# Patient Record
Sex: FEMALE | Race: BLACK OR AFRICAN AMERICAN | NOT HISPANIC OR LATINO | Employment: UNEMPLOYED | URBAN - METROPOLITAN AREA
[De-identification: names, ages, dates, MRNs, and addresses within clinical notes are randomized per-mention and may not be internally consistent; named-entity substitution may affect disease eponyms.]

---

## 2021-11-29 ENCOUNTER — CLINICAL SUPPORT (OUTPATIENT)
Dept: REHABILITATION | Facility: HOSPITAL | Age: 46
End: 2021-11-29
Payer: MEDICAID

## 2021-11-29 DIAGNOSIS — M54.50 LUMBAR PAIN: ICD-10-CM

## 2021-11-29 DIAGNOSIS — R53.1 WEAKNESS: ICD-10-CM

## 2021-11-29 PROCEDURE — 97161 PT EVAL LOW COMPLEX 20 MIN: CPT | Mod: PN | Performed by: PHYSICAL THERAPIST

## 2021-11-30 PROBLEM — R53.1 WEAKNESS: Status: ACTIVE | Noted: 2021-11-30

## 2021-11-30 PROBLEM — M54.50 LUMBAR PAIN: Status: ACTIVE | Noted: 2021-11-30

## 2021-12-06 ENCOUNTER — CLINICAL SUPPORT (OUTPATIENT)
Dept: REHABILITATION | Facility: HOSPITAL | Age: 46
End: 2021-12-06
Payer: MEDICAID

## 2021-12-06 DIAGNOSIS — M54.50 LUMBAR PAIN: ICD-10-CM

## 2021-12-06 DIAGNOSIS — R53.1 WEAKNESS: ICD-10-CM

## 2021-12-06 PROCEDURE — 97110 THERAPEUTIC EXERCISES: CPT | Mod: PN | Performed by: PHYSICAL THERAPIST

## 2021-12-08 ENCOUNTER — CLINICAL SUPPORT (OUTPATIENT)
Dept: REHABILITATION | Facility: HOSPITAL | Age: 46
End: 2021-12-08
Payer: MEDICAID

## 2021-12-08 DIAGNOSIS — M54.50 LUMBAR PAIN: ICD-10-CM

## 2021-12-08 DIAGNOSIS — R53.1 WEAKNESS: ICD-10-CM

## 2021-12-08 PROCEDURE — 97110 THERAPEUTIC EXERCISES: CPT | Mod: PN | Performed by: PHYSICAL THERAPIST

## 2021-12-15 ENCOUNTER — CLINICAL SUPPORT (OUTPATIENT)
Dept: REHABILITATION | Facility: HOSPITAL | Age: 46
End: 2021-12-15
Payer: MEDICAID

## 2021-12-15 DIAGNOSIS — M54.50 LUMBAR PAIN: ICD-10-CM

## 2021-12-15 DIAGNOSIS — R53.1 WEAKNESS: ICD-10-CM

## 2021-12-15 PROCEDURE — 97110 THERAPEUTIC EXERCISES: CPT | Mod: PN | Performed by: PHYSICAL THERAPIST

## 2021-12-17 ENCOUNTER — CLINICAL SUPPORT (OUTPATIENT)
Dept: REHABILITATION | Facility: HOSPITAL | Age: 46
End: 2021-12-17
Payer: MEDICAID

## 2021-12-17 DIAGNOSIS — M54.50 LUMBAR PAIN: ICD-10-CM

## 2021-12-17 DIAGNOSIS — R53.1 WEAKNESS: ICD-10-CM

## 2021-12-17 PROCEDURE — 97110 THERAPEUTIC EXERCISES: CPT | Mod: PN | Performed by: PHYSICAL THERAPIST

## 2021-12-29 ENCOUNTER — CLINICAL SUPPORT (OUTPATIENT)
Dept: REHABILITATION | Facility: HOSPITAL | Age: 46
End: 2021-12-29
Payer: MEDICAID

## 2021-12-29 DIAGNOSIS — R53.1 WEAKNESS: ICD-10-CM

## 2021-12-29 DIAGNOSIS — M54.50 LUMBAR PAIN: ICD-10-CM

## 2021-12-29 PROCEDURE — 97110 THERAPEUTIC EXERCISES: CPT | Mod: PN | Performed by: PHYSICAL THERAPIST

## 2022-01-13 ENCOUNTER — CLINICAL SUPPORT (OUTPATIENT)
Dept: REHABILITATION | Facility: HOSPITAL | Age: 47
End: 2022-01-13
Payer: MEDICAID

## 2022-01-13 DIAGNOSIS — M54.50 LUMBAR PAIN: ICD-10-CM

## 2022-01-13 DIAGNOSIS — R53.1 WEAKNESS: ICD-10-CM

## 2022-01-13 PROCEDURE — 97110 THERAPEUTIC EXERCISES: CPT | Mod: PN | Performed by: PHYSICAL THERAPIST

## 2022-01-13 NOTE — PROGRESS NOTES
"  Physical Therapy Daily Treatment Note     Name: Kyra [facility] Number: [id number]    Therapy Diagnosis:   Encounter Diagnoses   Name Primary?    Lumbar pain     Weakness      Physician: Angel Lam NP    Visit Date: 1/13/2022  Physician Orders: PT Eval and Treat   Medical Diagnosis from Referral: Low back pain   Evaluation Date: 11/29/2021  Authorization Period Expiration: 03/06/22  Plan of Care Expiration: 02/11/22  Progress Note Due: 12/29/21  Visit # / Visits authorized: 6/ 10   FOTO: /        Time In: 10:00 AM   Time Out: 10:45 AM   Total Billable Time: 40 minutes    Precautions: Standard    Subjective     Pt reports: That she has been having a lot of pain lately.   She was compliant with home exercise program.  Response to previous treatment: Soreness   Functional change: Ongoing    Pain: 7/10  Location: knees and back     Objective     Kyra received therapeutic exercises to develop strength, ROM and flexibility for 40 minutes including:  Upright  bike 6 min L 3  Ball flex stretch 3 min     Supine:  DKC w ball 2 min   Supine hip IR 2 min   Supine piriformis stretch 3 x 30" ea   HSS 3 x 30" ea   Bridges 2 x 10   SL hip abduction 2 x 10 B - NP   Clamshells B 10x red theraband   Shuttle B 2 min 2 1/2 bands   Shuttle R/L 1 min ea 2 bands     Standing:  Scapular retractions 2 x 10 red theraband - NP   Horizontal abduction 2 x 10 yellow theraband - NP   Straight arm lat pulldowns 2 x10 13#     Kyra received the following manual therapy techniques: Joint mobilizations and Soft tissue Mobilization were applied to the:  for  minutes, including:      Kyra participated in neuromuscular re-education activities to improve: Kinesthetic Sense and Proprioception for  minutes. The following activities were included:      Home Exercises Provided and Patient Education Provided     Education provided:   - On possibility of post treatment soreness     Written Home Exercises Provided: Patient instructed to cont " prior HEP.  Exercises were reviewed and Kyra was able to demonstrate them prior to the end of the session.  Kyra demonstrated good  understanding of the education provided.     See EMR under Patient Instructions for exercises provided prior visit.    Assessment     Kyra was able to perform all requested exercises despite reported pain. She will benefit from continued treatment with a focus on function.     Kyra is progressing well towards her goals.   Pt prognosis is Good.     Pt will continue to benefit from skilled outpatient physical therapy to address the deficits listed in the problem list box on initial evaluation, provide pt/family education and to maximize pt's level of independence in the home and community environment.     Pt's spiritual, cultural and educational needs considered and pt agreeable to plan of care and goals.    Anticipated barriers to physical therapy:     Goals:     Short Term Goals: 3 weeks   1) Pt will be I with established HEP - met 12/8/21  2) Pt will perform 20 minutes of household cleaning with pain no worse then 8/10 - progressing, not met   3) Pt will walk throughout the grocery store with pain no worse then 8/10 - progressing, not met      Long Term Goals: 6 weeks   1) Strength will improve by 1 grade to assist in improving ease of ADL related activities - progressing, not met   2) Pt will perform 30 minutes of household ADL's with pain no worse then 6/10 - progressing, not met   3) Pt will perform 11 sit to stands in 30 seconds to demonstrate improve functional strength - progressing, not met       Plan     Progress there ex as able    Berny Lund, PT

## 2022-01-13 NOTE — PLAN OF CARE
"[facility]  Physical Therapy POC Update    Date: 1/13/2022   Name: Kyra Harper  Clinic Number: [id number]    Therapy Diagnosis:   Encounter Diagnoses   Name Primary?    Lumbar pain     Weakness      Physician: Angel Lam NP    Physician Orders: PT Eval and Treat   Medical Diagnosis from Referral: Low back pain   Evaluation Date: 1/13/2022  Authorization Period Expiration: 12/31/21  Plan of Care Expiration: 01/10/21  Progress Note Due: 12/29/21  Visit # / Visits authorized: 6/ 10  FOTO: /      Precautions: Standard    Time In: 10:00 AM   Time Out: 2:30 PM   Total Appointment Time (timed & untimed codes): 32 minutes    Subjective   Date of onset: years   History of current condition - Kyra reports: A history of chronic back pain. She states that her back hurts her almost all of the time. She has had previous physical therapy treatment, which did help. She states that her pain has worsened over the last few months. She is able to perform ADL's but does not have as much functional endurance. She reports intermittently using a back brace and a cane when her pain is severe. Her pain is exacerbated by cold weather.      Interim update: Kyra states that she has been having a lot of "sciatica" pain over the last few weeks. She feels like the pain is just gripping her. The pain is varying in intensity. This pain has really limited her functional abilities lately.     No past medical history on file.  Kyra Harper  has no past surgical history on file.    Kyra currently has no medications in their medication list.    Review of patient's allergies indicates:  Not on File     Imaging: none recent    Prior Therapy: Previous episodes for back pain   Social History:  lives alone  Occupation: Not currently working   Prior Level of Function: Limited with ADL activities, but able to perform   Current Level of Function: Increased limitations with ADL performance; only able to tolerate 20-25 " minutes of ADL activities     Pain:  Current 7/10, worst 10/10, best 3/10   Location: bilateral back   Description: Aching, Dull, Burning and Sharp  Aggravating Factors: Sitting, Standing, Walking, Morning and Static positions   Easing Factors: rest    Pts goals: To maximize functionality and better control pain       Objective   Red Flag Screening:   Cough  Sneeze  Strain: (--)  Bladder/ bowel: (--)  Falls: (--)  Night pain: (--)  Unexplained weight loss: (--)  General health: fair     Posture/ Alignment: Poor    GAIT DEVIATIONS: Kyra amb with decreasd gait speed with a straight cane .    ROM:   ROM:   AROM  Comment   Flexion: 10%  * pain with all AROM    Extension: 50%     Lat Flex R: 50%     Lat Flex L: 50%     Rotation R: 75%     Rotation L: 75%     *pain    * = pain,  amount of   OP = overpressure  Initial    Right° Left°   Flexion WNL WNL   Extension WNL WNL   Internal Rotation WNL WNL   External Rotation WNL WNL   Abduction WNL WNL            Strength: Dermatomes:   Right Left Comment   L2 (lateral thigh) intact intact    L3 (medial thigh) intact intact    L4 (medial calf) intact intact    L5 (lateral calf) intact intact    S1 (lateral foot) intact intact    S2 (gastro/HS) intact intact    Saddle (cauda equina) intact intact      Myotomes:   Right Left Comment   Iliacus: (L2-3) 4/5 4/5    Knee extension (L3-4): 4/5 4/5    DF (L4-5): 4/5 4/5    Extensor digitorum longus, Peroneus(L5-S1): 4/5 4/5    Gastroc/Soleus(S1-S2): 4/5 4/5         DTR:   Right Left Comment   Patellar (L3-4) 1+ 1+    Achilles (S1) 2+ 2+        Special Tests:  -OH Squat: able to squat to 45 degrees   - MARIAJOSE (-)   -SLR:(-)       Functional Tests (* indicates w/ pain)   30 second stand test: 7x    Palpation:  Increased tone with muscle guarding in thoracic and lumbar paraspinals     Joint Play:  Limited PA glide all lumbar segments     Pt/family was provided educational information, including: role of PT, goals for PT, scheduling - pt  verbalized understanding. Discussed insurance plan with pt.     CMS Impairment/Limitation/Restriction for FOTO  Survey    Therapist reviewed FOTO scores for Kyra Harper on 1/13/2022.   FOTO documents entered into [employer] - see Media section.    Limitation Score: %  Category:     Current :            TREATMENT     See note section for today's visit.     Home Exercises and Patient Education Provided    Education provided:   - POC   - Role of physical therapy   - HEP   - Expectations of physical therapy     Written Home Exercises Provided: yes.  Exercises were reviewed and Kyra was able to demonstrate them prior to the end of the session.  Kyra demonstrated good  understanding of the education provided.     See EMR under Patient Instructions for exercises provided 11/29/2021.      Assessment   Pt presents with a medical diagnosis of low back pain.  Primary impairments include limited AROM, strength, balance, and pain which limits functional mobility. This pt is a good candidate for skilled PT tx and stands to benefit from a combination of manual therapy, therapeutic exercise, neuromuscular re-education, and modalities Prn. The pt has been educated on their dx/POC and consents to further PT tx. Due to the chronic nature of her pain and her multiple auto-immune disorders, the focus of physical therapy will be on improving ADL abilities. Her pain continues to be significantly limiting at this time. She will benefit from continued treatment with a focus on trying to maximize functional abilities.       Pt prognosis is Fair.   Pt will benefit from skilled outpatient Physical Therapy to address the deficits stated above and in the chart below, provide pt/family education, and to maximize pt's level of independence.     Plan of care discussed with patient: Yes  Pt's spiritual, cultural and educational needs considered and patient is agreeable to the plan of care and goals as stated below:     Anticipated Barriers for  therapy: None at this time     Medical Necessity is demonstrated by the following  History  Co-morbidities and personal factors that may impact the plan of care Co-morbidities:   difficulty sleeping, immunosuppression and fibromyalgia     Personal Factors:   coping style  lifestyle     high   Examination  Body Structures and Functions, activity limitations and participation restrictions that may impact the plan of care Body Regions:   back  lower extremities    Body Systems:    gross symmetry  ROM  strength  balance  motor control    Participation Restrictions:       Activity limitations:   Learning and applying knowledge  no deficits    General Tasks and Commands  no deficits    Communication  no deficits    Mobility  lifting and carrying objects  walking    Self care  no deficits    Domestic Life  shopping  cooking  doing house work (cleaning house, washing dishes, laundry)    Interactions/Relationships  no deficits    Life Areas  employment    Community and Social Life  community life  recreation and leisure         high   Clinical Presentation evolving clinical presentation with changing clinical characteristics moderate   Decision Making/ Complexity Score: moderate     Goals:  Short Term Goals: 3 weeks   1) Pt will be I with established HEP   2) Pt will perform 20 minutes of household cleaning with pain no worse then 8/10   3) Pt will walk throughout the grocery store with pain no worse then 8/10     Long Term Goals: 6 weeks   1) Strength will improve by 1 grade to assist in improving ease of ADL related activities   2) Pt will perform 30 minutes of household ADL's with pain no worse then 6/10  3) Pt will perform 11 sit to stands in 30 seconds to demonstrate improve functional strength         Plan   Plan of care Certification: 1/13/2022 to 02/11/22.    Outpatient Physical Therapy 2 times weekly for 4 weeks to include the following interventions: Manual Therapy, Neuromuscular Re-ed, Patient Education,  Therapeutic Activites, Therapeutic Exercise and dry needling .     Berny Lund, PT      I CERTIFY THE NEED FOR THESE SERVICES FURNISHED UNDER THIS PLAN OF TREATMENT AND WHILE UNDER MY CARE   Physician's comments:     Physician's Signature: ___________________________________________________

## 2022-01-18 ENCOUNTER — CLINICAL SUPPORT (OUTPATIENT)
Dept: REHABILITATION | Facility: HOSPITAL | Age: 47
End: 2022-01-18
Payer: MEDICAID

## 2022-01-18 DIAGNOSIS — R53.1 WEAKNESS: ICD-10-CM

## 2022-01-18 DIAGNOSIS — M54.50 LUMBAR PAIN: ICD-10-CM

## 2022-01-18 PROCEDURE — 97110 THERAPEUTIC EXERCISES: CPT | Mod: PN | Performed by: PHYSICAL THERAPIST

## 2022-01-18 NOTE — PROGRESS NOTES
"  Physical Therapy Daily Treatment Note     Name: Kyra [facility] Number: [id number]    Therapy Diagnosis:   Encounter Diagnoses   Name Primary?    Lumbar pain     Weakness      Physician: Angel Lam NP    Visit Date: 1/18/2022  Physician Orders: PT Eval and Treat   Medical Diagnosis from Referral: Low back pain   Evaluation Date: 11/29/2021  Authorization Period Expiration: 03/06/22  Plan of Care Expiration: 02/11/22  Progress Note Due: 12/29/21  Visit # / Visits authorized: 7 (2/12)  FOTO:        Time In: 10:00 AM   Time Out: 10:45 AM   Total Billable Time: 40 minutes    Precautions: Standard    Subjective     Pt reports: That she is feeling better today compared to last time.   She was compliant with home exercise program.  Response to previous treatment: Soreness   Functional change: Ongoing    Pain: 3/10  Location: knees and back     Objective     Kyra received therapeutic exercises to develop strength, ROM and flexibility for 45 minutes including:  Upright  bike 6 min L 3  Ball flex stretch 3 min     Supine:  DKC w ball 2 min   Supine hip IR 2 min   Supine piriformis stretch 3 x 30" ea   HSS 2 x 30" ea   Bridges 2 x 10   SL hip abduction 2 x 10 B - NP   Clamshells B 10x red theraband   Shuttle B 2 min 3 bands   Shuttle R/L 1 min ea 2 bands     Standing:  Rows 2 x 10 17#  Straight arm lat pulldowns 2 x10 13#     Kyra received the following manual therapy techniques: Joint mobilizations and Soft tissue Mobilization were applied to the:  for  minutes, including:      Kyra participated in neuromuscular re-education activities to improve: Kinesthetic Sense and Proprioception for  minutes. The following activities were included:      Home Exercises Provided and Patient Education Provided     Education provided:   - On possibility of post treatment soreness     Written Home Exercises Provided: Patient instructed to cont prior HEP.  Exercises were reviewed and Kyra was able to demonstrate them prior " to the end of the session.  Kyra demonstrated good  understanding of the education provided.     See EMR under Patient Instructions for exercises provided prior visit.    Assessment     Kyra was able to increase her resistance on the shuttle. Pain levels remain variable at this time. She was able to progress strengthening without adverse effects. She will benefit from continued progression of strengthening to maximize overall function.     Kyra is progressing well towards her goals.   Pt prognosis is Good.     Pt will continue to benefit from skilled outpatient physical therapy to address the deficits listed in the problem list box on initial evaluation, provide pt/family education and to maximize pt's level of independence in the home and community environment.     Pt's spiritual, cultural and educational needs considered and pt agreeable to plan of care and goals.    Anticipated barriers to physical therapy:     Goals:     Short Term Goals: 3 weeks   1) Pt will be I with established HEP - met 12/8/21  2) Pt will perform 20 minutes of household cleaning with pain no worse then 8/10 - progressing, not met   3) Pt will walk throughout the grocery store with pain no worse then 8/10 - progressing, not met      Long Term Goals: 6 weeks   1) Strength will improve by 1 grade to assist in improving ease of ADL related activities - progressing, not met   2) Pt will perform 30 minutes of household ADL's with pain no worse then 6/10 - progressing, not met   3) Pt will perform 11 sit to stands in 30 seconds to demonstrate improve functional strength - progressing, not met       Plan     Progress there ex as able    Berny Lund, PT